# Patient Record
Sex: MALE | Race: WHITE | NOT HISPANIC OR LATINO | Employment: FULL TIME | URBAN - NONMETROPOLITAN AREA
[De-identification: names, ages, dates, MRNs, and addresses within clinical notes are randomized per-mention and may not be internally consistent; named-entity substitution may affect disease eponyms.]

---

## 2021-07-24 ENCOUNTER — OFFICE VISIT (OUTPATIENT)
Dept: URGENT CARE | Facility: PHYSICIAN GROUP | Age: 51
End: 2021-07-24
Payer: COMMERCIAL

## 2021-07-24 VITALS
WEIGHT: 315 LBS | TEMPERATURE: 97.3 F | DIASTOLIC BLOOD PRESSURE: 90 MMHG | RESPIRATION RATE: 16 BRPM | HEIGHT: 70 IN | BODY MASS INDEX: 45.1 KG/M2 | SYSTOLIC BLOOD PRESSURE: 126 MMHG | OXYGEN SATURATION: 94 % | HEART RATE: 67 BPM

## 2021-07-24 DIAGNOSIS — B02.9 HERPES ZOSTER WITHOUT COMPLICATION: Primary | ICD-10-CM

## 2021-07-24 PROCEDURE — 99213 OFFICE O/P EST LOW 20 MIN: CPT | Performed by: PHYSICIAN ASSISTANT

## 2021-07-24 RX ORDER — VALACYCLOVIR HYDROCHLORIDE 1 G/1
1000 TABLET, FILM COATED ORAL 3 TIMES DAILY
Qty: 21 TABLET | Refills: 0 | Status: SHIPPED | OUTPATIENT
Start: 2021-07-24 | End: 2021-07-31

## 2021-07-24 RX ORDER — LISINOPRIL AND HYDROCHLOROTHIAZIDE 20; 12.5 MG/1; MG/1
2 TABLET ORAL
COMMUNITY
Start: 2021-06-22

## 2021-07-24 NOTE — PROGRESS NOTES
"Subjective:   Jefferson Hi is a 50 y.o. male who presents for Rash (On body, x5days )        Rash  This is a new problem. Episode onset: 4 days he noticed pain to the right neck and rash showed up the following day. The problem is unchanged. Location: R neck  The rash is characterized by redness, pain and blistering. His past medical history is significant for varicella (as a child ).       PMH:  has no past medical history on file.  MEDS:   Current Outpatient Medications:   •  lisinopril-hydrochlorothiazide (PRINZIDE) 20-12.5 MG per tablet, Take 2 Tablets by mouth., Disp: , Rfl:   •  valacyclovir (VALTREX) 1 GM Tab, Take 1 tablet by mouth 3 times a day for 7 days., Disp: 21 tablet, Rfl: 0  ALLERGIES: No Known Allergies  SURGHX: History reviewed. No pertinent surgical history.  SOCHX:  reports that he has never smoked. His smokeless tobacco use includes chew. He reports previous alcohol use. He reports previous drug use.  History reviewed. No pertinent family history.     Objective:   /90   Pulse 67   Temp 36.3 °C (97.3 °F) (Temporal)   Resp 16   Ht 1.778 m (5' 10\")   Wt (!) 144 kg (317 lb)   SpO2 94%   BMI 45.48 kg/m²     Physical Exam  Vitals reviewed.   Constitutional:       General: He is not in acute distress.     Appearance: He is well-developed.   HENT:      Head: Normocephalic and atraumatic.   Neck:      Trachea: No tracheal deviation.   Skin:     General: Skin is warm and dry.      Capillary Refill: Capillary refill takes less than 2 seconds.          Neurological:      Mental Status: He is alert and oriented to person, place, and time.   Psychiatric:         Mood and Affect: Mood normal.         Behavior: Behavior normal.           Assessment/Plan:     1. Herpes zoster without complication  valacyclovir (VALTREX) 1 GM Tab     Supportive care reviewed.  Continue alternating Tylenol/Motrin.  Shingles handout provided.    If symptoms worsen or persist patient can return to clinic for " reevaluation. Side effects of medication discussed. Patient confirmed understanding of information.    Please note that this dictation was created using voice recognition software. I have made every reasonable attempt to correct obvious errors, but I expect that there are errors of grammar and possibly content that I did not discover before finalizing the note.

## 2021-07-26 ENCOUNTER — TELEPHONE (OUTPATIENT)
Dept: URGENT CARE | Facility: PHYSICIAN GROUP | Age: 51
End: 2021-07-26

## 2021-07-26 NOTE — LETTER
July 30, 2021         Patient: Jefferson Hi   YOB: 1970   Date of Visit: 7/26/2021           To Whom it May Concern:    Jefferson Hi was seen in my clinic on 7/26/2021. Please excuse him from travel due to current medical conditon.     If you have any questions or concerns, please don't hesitate to call.        Sincerely,           Regi Palmer P.A.-C.  Electronically Signed

## 2021-07-26 NOTE — TELEPHONE ENCOUNTER
Patient called today stating he needs a letter for cancellation so he can get reimbursed for his trip that he can longer go on.

## 2021-07-26 NOTE — LETTER
July 30, 2021         Patient: Jefferson Hi   YOB: 1970   Date of Visit: 7/26/2021           To Whom it May Concern:    Jefferson Hi was seen in my clinic on 7/26/2021. Please excuse him from travel due to current medical condition.    If you have any questions or concerns, please don't hesitate to call.        Sincerely,           Regi Palmer P.A.-C.  Electronically Signed

## 2025-06-23 SDOH — ECONOMIC STABILITY: TRANSPORTATION INSECURITY
IN THE PAST 12 MONTHS, HAS LACK OF TRANSPORTATION KEPT YOU FROM MEETINGS, WORK, OR FROM GETTING THINGS NEEDED FOR DAILY LIVING?: NO

## 2025-06-23 SDOH — HEALTH STABILITY: PHYSICAL HEALTH: ON AVERAGE, HOW MANY DAYS PER WEEK DO YOU ENGAGE IN MODERATE TO STRENUOUS EXERCISE (LIKE A BRISK WALK)?: 1 DAY

## 2025-06-23 SDOH — ECONOMIC STABILITY: INCOME INSECURITY: IN THE LAST 12 MONTHS, WAS THERE A TIME WHEN YOU WERE NOT ABLE TO PAY THE MORTGAGE OR RENT ON TIME?: NO

## 2025-06-23 SDOH — HEALTH STABILITY: PHYSICAL HEALTH: ON AVERAGE, HOW MANY MINUTES DO YOU ENGAGE IN EXERCISE AT THIS LEVEL?: 20 MIN

## 2025-06-23 SDOH — ECONOMIC STABILITY: FOOD INSECURITY: WITHIN THE PAST 12 MONTHS, THE FOOD YOU BOUGHT JUST DIDN'T LAST AND YOU DIDN'T HAVE MONEY TO GET MORE.: NEVER TRUE

## 2025-06-23 SDOH — HEALTH STABILITY: MENTAL HEALTH
STRESS IS WHEN SOMEONE FEELS TENSE, NERVOUS, ANXIOUS, OR CAN'T SLEEP AT NIGHT BECAUSE THEIR MIND IS TROUBLED. HOW STRESSED ARE YOU?: ONLY A LITTLE

## 2025-06-23 SDOH — ECONOMIC STABILITY: FOOD INSECURITY: WITHIN THE PAST 12 MONTHS, YOU WORRIED THAT YOUR FOOD WOULD RUN OUT BEFORE YOU GOT MONEY TO BUY MORE.: NEVER TRUE

## 2025-06-23 SDOH — ECONOMIC STABILITY: TRANSPORTATION INSECURITY
IN THE PAST 12 MONTHS, HAS THE LACK OF TRANSPORTATION KEPT YOU FROM MEDICAL APPOINTMENTS OR FROM GETTING MEDICATIONS?: NO

## 2025-06-23 SDOH — ECONOMIC STABILITY: INCOME INSECURITY: HOW HARD IS IT FOR YOU TO PAY FOR THE VERY BASICS LIKE FOOD, HOUSING, MEDICAL CARE, AND HEATING?: NOT HARD AT ALL

## 2025-06-23 SDOH — ECONOMIC STABILITY: HOUSING INSECURITY
IN THE LAST 12 MONTHS, WAS THERE A TIME WHEN YOU DID NOT HAVE A STEADY PLACE TO SLEEP OR SLEPT IN A SHELTER (INCLUDING NOW)?: NO

## 2025-06-23 SDOH — ECONOMIC STABILITY: TRANSPORTATION INSECURITY
IN THE PAST 12 MONTHS, HAS LACK OF RELIABLE TRANSPORTATION KEPT YOU FROM MEDICAL APPOINTMENTS, MEETINGS, WORK OR FROM GETTING THINGS NEEDED FOR DAILY LIVING?: NO

## 2025-06-23 ASSESSMENT — SOCIAL DETERMINANTS OF HEALTH (SDOH)
HOW OFTEN DO YOU ATTEND CHURCH OR RELIGIOUS SERVICES?: NEVER
HOW OFTEN DO YOU GET TOGETHER WITH FRIENDS OR RELATIVES?: ONCE A WEEK
HOW OFTEN DO YOU HAVE SIX OR MORE DRINKS ON ONE OCCASION: NEVER
DO YOU BELONG TO ANY CLUBS OR ORGANIZATIONS SUCH AS CHURCH GROUPS UNIONS, FRATERNAL OR ATHLETIC GROUPS, OR SCHOOL GROUPS?: YES
HOW MANY DRINKS CONTAINING ALCOHOL DO YOU HAVE ON A TYPICAL DAY WHEN YOU ARE DRINKING: PATIENT DOES NOT DRINK
IN A TYPICAL WEEK, HOW MANY TIMES DO YOU TALK ON THE PHONE WITH FAMILY, FRIENDS, OR NEIGHBORS?: ONCE A WEEK
HOW OFTEN DO YOU ATTENT MEETINGS OF THE CLUB OR ORGANIZATION YOU BELONG TO?: 1 TO 4 TIMES PER YEAR
DO YOU BELONG TO ANY CLUBS OR ORGANIZATIONS SUCH AS CHURCH GROUPS UNIONS, FRATERNAL OR ATHLETIC GROUPS, OR SCHOOL GROUPS?: YES
WITHIN THE PAST 12 MONTHS, YOU WORRIED THAT YOUR FOOD WOULD RUN OUT BEFORE YOU GOT THE MONEY TO BUY MORE: NEVER TRUE
IN THE PAST 12 MONTHS, HAS THE ELECTRIC, GAS, OIL, OR WATER COMPANY THREATENED TO SHUT OFF SERVICE IN YOUR HOME?: NO
HOW OFTEN DO YOU ATTENT MEETINGS OF THE CLUB OR ORGANIZATION YOU BELONG TO?: 1 TO 4 TIMES PER YEAR
HOW HARD IS IT FOR YOU TO PAY FOR THE VERY BASICS LIKE FOOD, HOUSING, MEDICAL CARE, AND HEATING?: NOT HARD AT ALL
HOW OFTEN DO YOU ATTEND CHURCH OR RELIGIOUS SERVICES?: NEVER
HOW OFTEN DO YOU HAVE A DRINK CONTAINING ALCOHOL: MONTHLY OR LESS
IN A TYPICAL WEEK, HOW MANY TIMES DO YOU TALK ON THE PHONE WITH FAMILY, FRIENDS, OR NEIGHBORS?: ONCE A WEEK
HOW OFTEN DO YOU GET TOGETHER WITH FRIENDS OR RELATIVES?: ONCE A WEEK

## 2025-06-23 ASSESSMENT — LIFESTYLE VARIABLES
HOW OFTEN DO YOU HAVE A DRINK CONTAINING ALCOHOL: MONTHLY OR LESS
AUDIT-C TOTAL SCORE: 1
HOW MANY STANDARD DRINKS CONTAINING ALCOHOL DO YOU HAVE ON A TYPICAL DAY: PATIENT DOES NOT DRINK
HOW OFTEN DO YOU HAVE SIX OR MORE DRINKS ON ONE OCCASION: NEVER
SKIP TO QUESTIONS 9-10: 1

## 2025-06-26 ENCOUNTER — OFFICE VISIT (OUTPATIENT)
Dept: MEDICAL GROUP | Facility: PHYSICIAN GROUP | Age: 55
End: 2025-06-26
Payer: COMMERCIAL

## 2025-06-26 VITALS
TEMPERATURE: 98.3 F | HEART RATE: 72 BPM | WEIGHT: 315 LBS | BODY MASS INDEX: 45.1 KG/M2 | SYSTOLIC BLOOD PRESSURE: 142 MMHG | RESPIRATION RATE: 16 BRPM | OXYGEN SATURATION: 96 % | HEIGHT: 70 IN | DIASTOLIC BLOOD PRESSURE: 90 MMHG

## 2025-06-26 DIAGNOSIS — E66.01 MORBID OBESITY WITH BMI OF 45.0-49.9, ADULT (HCC): ICD-10-CM

## 2025-06-26 DIAGNOSIS — Z00.00 BLOOD TESTS FOR ROUTINE GENERAL PHYSICAL EXAMINATION: ICD-10-CM

## 2025-06-26 DIAGNOSIS — M17.12 PRIMARY OSTEOARTHRITIS OF LEFT KNEE: ICD-10-CM

## 2025-06-26 DIAGNOSIS — Z12.11 SCREENING FOR COLORECTAL CANCER: ICD-10-CM

## 2025-06-26 DIAGNOSIS — R22.1 MASS IN NECK: ICD-10-CM

## 2025-06-26 DIAGNOSIS — Z12.12 SCREENING FOR COLORECTAL CANCER: ICD-10-CM

## 2025-06-26 DIAGNOSIS — I10 PRIMARY HYPERTENSION: Primary | ICD-10-CM

## 2025-06-26 PROCEDURE — 3080F DIAST BP >= 90 MM HG: CPT | Performed by: NURSE PRACTITIONER

## 2025-06-26 PROCEDURE — 3077F SYST BP >= 140 MM HG: CPT | Performed by: NURSE PRACTITIONER

## 2025-06-26 PROCEDURE — 99204 OFFICE O/P NEW MOD 45 MIN: CPT | Performed by: NURSE PRACTITIONER

## 2025-06-26 RX ORDER — LIDOCAINE 50 MG/G
1 PATCH TOPICAL
COMMUNITY
Start: 2025-06-01 | End: 2025-06-26 | Stop reason: SDUPTHER

## 2025-06-26 RX ORDER — AMLODIPINE BESYLATE 10 MG/1
TABLET ORAL
COMMUNITY
Start: 2025-05-31 | End: 2025-06-26 | Stop reason: SDUPTHER

## 2025-06-26 RX ORDER — LISINOPRIL AND HYDROCHLOROTHIAZIDE 12.5; 2 MG/1; MG/1
2 TABLET ORAL DAILY
Qty: 180 TABLET | Refills: 3 | Status: SHIPPED | OUTPATIENT
Start: 2025-06-26

## 2025-06-26 RX ORDER — LIDOCAINE 50 MG/G
1 PATCH TOPICAL EVERY 24 HOURS
Qty: 30 PATCH | Refills: 1 | Status: SHIPPED | OUTPATIENT
Start: 2025-06-26

## 2025-06-26 RX ORDER — MELOXICAM 15 MG/1
15 TABLET ORAL DAILY
COMMUNITY
Start: 2025-06-18 | End: 2025-07-18

## 2025-06-26 RX ORDER — AMLODIPINE,VALSARTAN AND HYDROCHLOROTHIAZIDE 10; 12.5; 16 MG/1; MG/1; MG/1
TABLET, FILM COATED ORAL
COMMUNITY
Start: 2023-12-31 | End: 2025-06-26

## 2025-06-26 RX ORDER — AMLODIPINE BESYLATE 10 MG/1
10 TABLET ORAL DAILY
Qty: 90 TABLET | Refills: 3 | Status: SHIPPED | OUTPATIENT
Start: 2025-06-26

## 2025-06-26 ASSESSMENT — PATIENT HEALTH QUESTIONNAIRE - PHQ9: CLINICAL INTERPRETATION OF PHQ2 SCORE: 0

## 2025-06-26 ASSESSMENT — ENCOUNTER SYMPTOMS
DEPRESSION: 0
FEVER: 0
CONSTIPATION: 0
ABDOMINAL PAIN: 0
VOMITING: 0
DIZZINESS: 0
DIARRHEA: 0
SHORTNESS OF BREATH: 0
EYES NEGATIVE: 1
WEIGHT LOSS: 0
INSOMNIA: 0
PALPITATIONS: 0
NAUSEA: 0
NERVOUS/ANXIOUS: 0
HEADACHES: 0
HEARTBURN: 0
CHILLS: 0

## 2025-06-26 NOTE — PROGRESS NOTES
Verbal consent was acquired by the patient to use [employer] ambient listening note generation during this visit     CC:  Chief Complaint   Patient presents with    Establish Care     Weight loss injections        Jefferson Hi 54 y.o. male  patient presenting for     History of Present Illness  The patient is a 54-year-old male who presents today to establish care and discuss weight loss.    Hypertension  - Diagnosed with hypertension and currently on amlodipine 10 mg daily and lisinopril-hydrochlorothiazide 40-25 mg.  - Blood pressure readings at home typically range from 125 to 135 over 72 to 85.  - Reports mild lower leg swelling at the end of the day, for which he wears compression socks about three times a week.  - Denies fevers, chills, changes in skin, vision, or hearing, chest pain, palpitations, shortness of breath, nausea, vomiting, diarrhea, constipation, abdominal pain, dizziness, or headaches.  - Denies thoughts of self-harm or harm to others.  - Has been on this regimen for approximately 8 to 10 years without any changes in dosage.    Left Knee Pain  - Experienced left knee pain a few weeks ago and sought medical attention at an urgent care facility.  - Diagnosed with mild osteoarthritis and prescribed lidocaine patches and meloxicam as needed.  - Continues to experience pain in his left knee despite treatment.  - Under the care of an orthopedic specialist for this issue.  - Uses lidocaine patches as needed, leaving them on for about 5 to 6 hours each time.    Weight Loss  - Acknowledges the need for weight loss to improve overall health and mobility.  - Has attempted various weight loss strategies but has not found a quick solution.  - Began a predominantly carnivore diet around [facility], resulting in a weight loss of about 20 pounds and significant improvements in energy and mobility.  - Discontinued the diet due to personal circumstances and is now considering resuming it.  - Contemplating the  "use of weight loss injections.    Neck Mass  - Noticed a small mass on his neck a couple of months ago.  - Reports no difficulty swallowing or pain associated with the mass.  - Believes the mass may have decreased in size.    Skin Tags  - Reports multiple skin tags under his arms.    Colonoscopy  - Has not undergone a colonoscopy but is open to the possibility if necessary.  - Reports regular bowel movements.        Review of Systems   Constitutional:  Negative for chills, fever, malaise/fatigue and weight loss.   HENT: Negative.     Eyes: Negative.    Respiratory:  Negative for shortness of breath.    Cardiovascular:  Negative for chest pain and palpitations.   Gastrointestinal:  Negative for abdominal pain, constipation, diarrhea, heartburn, nausea and vomiting.   Genitourinary: Negative.    Musculoskeletal:  Positive for joint pain.   Skin: Negative.    Neurological:  Negative for dizziness and headaches.   Psychiatric/Behavioral:  Negative for depression and suicidal ideas. The patient is not nervous/anxious and does not have insomnia.        BP (!) 142/90   Pulse 72   Temp 36.8 °C (98.3 °F) (Temporal)   Resp 16   Ht 1.778 m (5' 10\")   Wt (!) 148 kg (326 lb 3.2 oz)   SpO2 96% , Body mass index is 46.8 kg/m².    Physical Exam  Constitutional:       Appearance: Normal appearance. He is normal weight.   HENT:      Head: Normocephalic and atraumatic.      Right Ear: Tympanic membrane, ear canal and external ear normal.      Left Ear: Tympanic membrane, ear canal and external ear normal.      Nose: Nose normal.      Mouth/Throat:      Mouth: Mucous membranes are moist.      Pharynx: Oropharynx is clear.   Eyes:      Extraocular Movements: Extraocular movements intact.      Conjunctiva/sclera: Conjunctivae normal.      Pupils: Pupils are equal, round, and reactive to light.   Cardiovascular:      Rate and Rhythm: Normal rate and regular rhythm.      Pulses: Normal pulses.      Heart sounds: Normal heart sounds. "   Pulmonary:      Effort: Pulmonary effort is normal.      Breath sounds: Normal breath sounds.   Abdominal:      General: Abdomen is flat. Bowel sounds are normal.      Palpations: Abdomen is soft.   Musculoskeletal:         General: Normal range of motion.      Cervical back: Normal range of motion and neck supple.   Skin:     General: Skin is warm and dry.      Comments: Swelling to right side sternal notch 2-3 cm in diameter, soft, nontender   Neurological:      General: No focal deficit present.      Mental Status: He is alert and oriented to person, place, and time. Mental status is at baseline.   Psychiatric:         Mood and Affect: Mood normal.         Behavior: Behavior normal.         Thought Content: Thought content normal.         Judgment: Judgment normal.           Results      Assessment and Plan    Assessment & Plan  1. Hypertension.  - Blood pressure readings are slightly elevated during this visit.  - Currently taking amlodipine 10 mg daily and Prinzide (lisinopril/hydrochlorothiazide) 40-25 mg daily.  - Refills for these medications will be provided and sent to the pharmacy.  - Blood work will be ordered to assess kidney function, liver function, glucose levels, and thyroid function.    2. Left knee osteoarthritis.  - Reports persistent pain in the left knee despite using lidocaine patches and meloxicam as needed.  - Physical exam findings indicate continued discomfort.  - Will continue to follow up with the orthopedic doctor for further management.  - A package of 30 lidocaine patches 5% will be provided and sent to the pharmacy.    3. Weight management.  - Expressed interest in weight loss medications.  - Discussed the importance of lifestyle modifications, including dietary changes and regular exercise.  - Fasting blood work will be ordered to assess kidney function, liver function, glucose levels, and thyroid function.  - A list of potential weight loss medications will be provided for  discussion with the insurance provider.    4. Neck mass.  - Noted a small lump on the neck, not associated with the thyroid or lymph nodes.  - Physical exam findings suggest a lipoma.  - An ultrasound of the soft tissue in the head and neck region will be ordered to further evaluate the lump.  - No associated symptoms such as difficulty swallowing or pain.     1. Primary hypertension (Primary)  Chronic and stable condition   - amLODIPine (NORVASC) 10 MG Tab; Take 1 Tablet by mouth every day.  Dispense: 90 Tablet; Refill: 3  - lisinopril-hydrochlorothiazide (PRINZIDE) 20-12.5 MG per tablet; Take 2 Tablets by mouth every day.  Dispense: 180 Tablet; Refill: 3  - CBC WITHOUT DIFFERENTIAL; Future  - Lipid Profile; Future  - Comp Metabolic Panel; Future    2. Morbid obesity with BMI of 45.0-49.9, adult (HCC)  Chronic and stable condition   - FREE THYROXINE; Future  - HEMOGLOBIN A1C; Future  - Lipid Profile; Future  - TSH; Future  - Comp Metabolic Panel; Future    3. Primary osteoarthritis of left knee  Chronic and stable condition   - lidocaine (LIDODERM) 5 % Patch; Place 1 Patch on the skin every 24 hours.  Dispense: 30 Patch; Refill: 1    4. Blood tests for routine general physical examination  - CBC WITHOUT DIFFERENTIAL; Future  - FREE THYROXINE; Future  - HEMOGLOBIN A1C; Future  - Lipid Profile; Future  - VITAMIN D,25 HYDROXY (DEFICIENCY); Future  - TSH; Future  - Comp Metabolic Panel; Future    5. Mass in neck  Acute and uncomplicated condition  - US-SOFT TISSUES OF HEAD - NECK; Future    6. Screening for colorectal cancer  - Cologuard® colon cancer screening           Discussed with patient possible alternative diagnoses, patient is to take all medications as prescribed.     If symptoms persist FU w/PCP, if symptoms worsen go to emergency room.     If experiencing any side effects from prescribed medications reports to the office immediately or go to emergency room.    Reviewed indication, dosage, usage and  potential adverse effects of prescribed medications.     Reviewed risks and benefits of treatment plan. Patient verbalizes understanding of all instruction and verbally agrees to plan.    Return for pending labs and imaging.    This note was created using voice recognition software ([employer]). The accuracy of the dictation is limited by the abilities of the software. I have reviewed the note prior to signing, however some errors in grammar and context are still possible. If you have any questions related to this note please do not hesitate to contact our office.

## 2025-07-10 LAB — NONINV COLON CA DNA+OCC BLD SCRN STL QL: NEGATIVE

## 2025-07-18 ENCOUNTER — OFFICE VISIT (OUTPATIENT)
Dept: MEDICAL GROUP | Facility: PHYSICIAN GROUP | Age: 55
End: 2025-07-18
Payer: COMMERCIAL

## 2025-07-18 VITALS
SYSTOLIC BLOOD PRESSURE: 144 MMHG | RESPIRATION RATE: 16 BRPM | HEIGHT: 70 IN | TEMPERATURE: 97.5 F | DIASTOLIC BLOOD PRESSURE: 86 MMHG | BODY MASS INDEX: 45.1 KG/M2 | OXYGEN SATURATION: 96 % | HEART RATE: 84 BPM | WEIGHT: 315 LBS

## 2025-07-18 DIAGNOSIS — E55.9 VITAMIN D DEFICIENCY: ICD-10-CM

## 2025-07-18 DIAGNOSIS — I10 PRIMARY HYPERTENSION: Primary | ICD-10-CM

## 2025-07-18 DIAGNOSIS — R73.03 PREDIABETES: ICD-10-CM

## 2025-07-18 DIAGNOSIS — E66.01 MORBID OBESITY WITH BMI OF 45.0-49.9, ADULT (HCC): ICD-10-CM

## 2025-07-18 DIAGNOSIS — M17.12 PRIMARY OSTEOARTHRITIS OF LEFT KNEE: ICD-10-CM

## 2025-07-18 PROCEDURE — 3079F DIAST BP 80-89 MM HG: CPT | Performed by: NURSE PRACTITIONER

## 2025-07-18 PROCEDURE — 3077F SYST BP >= 140 MM HG: CPT | Performed by: NURSE PRACTITIONER

## 2025-07-18 PROCEDURE — 99214 OFFICE O/P EST MOD 30 MIN: CPT | Performed by: NURSE PRACTITIONER

## 2025-07-18 RX ORDER — ERGOCALCIFEROL 1.25 MG/1
50000 CAPSULE, LIQUID FILLED ORAL
Qty: 12 CAPSULE | Refills: 3 | Status: SHIPPED | OUTPATIENT
Start: 2025-07-18

## 2025-07-18 RX ORDER — TIRZEPATIDE 2.5 MG/.5ML
2.5 INJECTION, SOLUTION SUBCUTANEOUS
Qty: 2 ML | Refills: 0 | Status: SHIPPED | OUTPATIENT
Start: 2025-07-18 | End: 2025-08-17

## 2025-07-18 ASSESSMENT — ENCOUNTER SYMPTOMS
CHILLS: 0
PALPITATIONS: 0
HEADACHES: 0
DIZZINESS: 0
WEIGHT LOSS: 0
SHORTNESS OF BREATH: 0
FEVER: 0

## 2025-07-18 NOTE — PROGRESS NOTES
Verbal consent was acquired by the patient to use [employer] ambient listening note generation during this visit     CC:  Chief Complaint   Patient presents with    Lab Results     No new complaints       Jefferson Hi 54 y.o. male  patient presenting for     History of Present Illness  The patient is a 54-year-old male who presents for a follow-up on blood work, hypertension, prediabetes, weight management, vitamin D deficiency, hypertriglyceridemia, and knee pain.    Hypertension  - His blood pressure is well-managed, with readings typically in the low 130s over 80s at night.  - He monitors his blood pressure every three to four nights but does not check it in the morning.  - His medication regimen includes Prinzide and Norvasc, which he takes in the morning.    Weight Management  - His current weight is 320 pounds, unchanged from his last visit.  - He acknowledges that there are aspects of his diet that could be improved.  - He consumes a significant amount of Red Bull, which he believes may be impacting his triglyceride levels.  - He does not consume sugar-free Red Bull.    Hypertriglyceridemia  - He is interested in exploring the possibility of receiving fat injections.  - He has not yet discussed this with his insurance provider as he wanted to complete his blood tests first.  - He is also interested in having his blood tested again to monitor any improvements.    Vitamin D Deficiency  - He has previously taken fish oil caplets but discontinued them due to an unpleasant taste when burping.  - He is considering trying the gummy form of fish oil.    Knee Pain  - He has been experiencing knee pain, for which he was prescribed lidocaine patches.  - These have provided significant relief, and he believes there are refills available.  - He is feeling much better in his knee.  - He now experiences a popping sensation at the back of his knee, which feels like tissue and causes some stiffness.  - This popping occurs  "intermittently but is not painful.  - He also reports a sensation similar to the onset of a cramp in his lower leg, just below the calf muscle.  - This area becomes sore for several days, causing him to hobble around.  - He is seeking a referral to a specialist for further evaluation.        Review of Systems   Constitutional:  Negative for chills, fever, malaise/fatigue and weight loss.   Respiratory:  Negative for shortness of breath.    Cardiovascular:  Negative for chest pain and palpitations.   Musculoskeletal:  Positive for joint pain.   Neurological:  Negative for dizziness and headaches.       BP (!) 144/86   Pulse 84   Temp 36.4 °C (97.5 °F) (Temporal)   Resp 16   Ht 1.778 m (5' 10\")   Wt (!) 172 kg (380 lb)   SpO2 96% , Body mass index is 54.52 kg/m².    Physical Exam  Constitutional:       Appearance: Normal appearance.   HENT:      Head: Normocephalic and atraumatic.   Pulmonary:      Effort: Pulmonary effort is normal.   Neurological:      Mental Status: He is alert and oriented to person, place, and time.   Psychiatric:         Mood and Affect: Mood normal.         Behavior: Behavior normal.         Thought Content: Thought content normal.         Judgment: Judgment normal.           Results  Labs   - Hemoglobin A1c: 5.9%   - Triglycerides: 386 mg/dL   - LDL: 113 mg/dL   - HDL: 32 mg/dL   - Vitamin D: 13.8 ng/mL    Diagnostic Testing   - Cologuard: Negative    Assessment and Plan    Assessment & Plan  1. Hypertension.  - Home blood pressure readings are stable in the 130s/80s range.  - Continues current medications, Prinzide and Norvasc.  - No morning blood pressure checks; advised to continue monitoring at night.  - Blood pressure management discussed.    2. Prediabetes.  - Hemoglobin A1c is 5.9, indicating prediabetes.  - Prescription for Zepbound (tirzepatide) sent to pharmacy.  - Advised to contact insurance company to confirm coverage; may consider compounded semaglutide or tirzepatide if " not covered.  - Encouraged to maintain a high-protein diet and regular exercise regimen.    3. Hypertriglyceridemia.  - Triglyceride level elevated at 386 mg/dL.  - Advised to reduce intake of high-sugar and fatty foods, including Red Bull.  - Discussed impact of diet on triglycerides and overall health.  - Recommended dietary changes to improve triglyceride levels.    4. Vitamin D deficiency.  - Vitamin D level significantly low at 13.8 ng/mL.  - Prescription for high-dose vitamin D tablets to be taken once weekly provided.  - Discussed importance of vitamin D supplementation.  - Plan to monitor vitamin D levels and adjust treatment as needed.    5. Knee pain.  - Reports improvement in knee pain with lidocaine patches but now experiences popping and stiffness in the back of the knee.  - Referral to [facility] made for further evaluation.  - Discussed previous treatment and current symptoms.  - Plan for orthopedic evaluation to determine cause and treatment.    6. Health maintenance.  - Cologuard test returned negative results, valid for three years.  - Advised to monitor for changes in stool habits or presence of blood in stool.  - Discussed reliability of Cologuard test and importance of follow-up.  - Plan to report any concerning symptoms promptly.    7. Hyperlipidemia.  - LDL cholesterol is 113 mg/dL, HDL cholesterol is low at 32 mg/dL.  - Advised to start over-the-counter fish oil supplements to improve lipid profile.  - Discussed cholesterol levels and potential benefits of fish oil.  - Plan to monitor lipid levels and adjust treatment as needed.    8. Weight management.  - BMI is 46.  - Encouraged to maintain a high-protein diet and regular exercise regimen.  - Prescription for Zepbound (tirzepatide) sent to pharmacy.  - Advised to contact insurance company to confirm coverage; may consider compounded semaglutide or tirzepatide if not covered.    Follow-up  - Follow up in one month after starting the  medication to assess progress with diet and exercise.     1. Primary hypertension (Primary)  Chronic and stable condition  - Tirzepatide-Weight Management (ZEPBOUND) 2.5 MG/0.5ML Solution Auto-injector; Inject 2.5 mg under the skin every 7 days for 30 days.  Dispense: 2 mL; Refill: 0    2. Morbid obesity with BMI of 45.0-49.9, adult (HCC)  Chronic and stable condition  - Tirzepatide-Weight Management (ZEPBOUND) 2.5 MG/0.5ML Solution Auto-injector; Inject 2.5 mg under the skin every 7 days for 30 days.  Dispense: 2 mL; Refill: 0    3. Prediabetes  Chronic and stable condition  - Tirzepatide-Weight Management (ZEPBOUND) 2.5 MG/0.5ML Solution Auto-injector; Inject 2.5 mg under the skin every 7 days for 30 days.  Dispense: 2 mL; Refill: 0    4. Vitamin D deficiency  Chronic and stable condition  - vitamin D2 (ERGOCALCIFEROL) 1.25 MG (64120 UT) Cap capsule; Take 1 Capsule by mouth every 7 days.  Dispense: 12 Capsule; Refill: 3    5. Primary osteoarthritis of left knee  Chronic and stable condition  - Referral to Orthopedics       Discussed with patient possible alternative diagnoses, patient is to take all medications as prescribed.     If symptoms persist FU w/PCP, if symptoms worsen go to emergency room.     If experiencing any side effects from prescribed medications reports to the office immediately or go to emergency room.    Reviewed indication, dosage, usage and potential adverse effects of prescribed medications.     Reviewed risks and benefits of treatment plan. Patient verbalizes understanding of all instruction and verbally agrees to plan.    No follow-ups on file.    This note was created using voice recognition software ([employer]). The accuracy of the dictation is limited by the abilities of the software. I have reviewed the note prior to signing, however some errors in grammar and context are still possible. If you have any questions related to this note please do not hesitate to contact our office.

## 2025-07-18 NOTE — PATIENT INSTRUCTIONS
Start over the counter fish oil to help with lower the LD And increasing the HDL     Vitamin D prescription    Zepbond at pharmacy   If they do not over it:  I recommend patient calling insurance to see what they cover in this class of medications for weight loss  Here are a list of meds similar to zepbond    Dulaglutide injectable (weekly; marketed as Trulicity)  Exenatide injectable (daily; marketed as Byetta)  Exenatide ER injectable (weekly; marketed as Bydureon)  Liraglutide injectable (daily; marketed as Victoza)  Lixisenatide injectable (daily; marketed as Adlyxin)  Semaglutide injectable (weekly; marketed as Ozempic)  Semaglutide oral (daily; marketed as Rybelsus)

## 2025-07-24 NOTE — Clinical Note
REFERRAL APPROVAL NOTICE         Sent on July 24, 2025                   Jefferson Hi  [address] [address]                   Dear Mr. Hi,    After a careful review of the medical information and benefit coverage, [facility] has processed your referral. See below for additional details.    If applicable, you must be actively enrolled with your insurance for coverage of the authorized service. If you have any questions regarding your coverage, please contact your insurance directly.    REFERRAL INFORMATION   Referral #:  [id number]  Referred-To Department    Referred-By Provider:  Orthopedics    ROLAND Zepeda   [facility] (joint)      [address] [id number] [phone] [address] [facility] [address]  [phone]    Referral Start Date:  07/18/2025  Referral End Date:   07/18/2026             SCHEDULING  If you do not already have an appointment, please call [phone] to make an appointment.     MORE INFORMATION  If you do not already have a [employer] account, sign up at: [email or web address]  You can access your medical information, make appointments, see lab results, billing information, and more.  If you have questions regarding this referral, please contact  the [facility] Referrals department at:             [phone]. Monday - Friday 8:00AM - 5:00PM.     Sincerely,    [facility]